# Patient Record
Sex: FEMALE | Race: OTHER | Employment: OTHER | URBAN - METROPOLITAN AREA
[De-identification: names, ages, dates, MRNs, and addresses within clinical notes are randomized per-mention and may not be internally consistent; named-entity substitution may affect disease eponyms.]

---

## 2022-03-30 ENCOUNTER — NEW PATIENT (OUTPATIENT)
Dept: URBAN - METROPOLITAN AREA CLINIC 43 | Facility: CLINIC | Age: 57
End: 2022-03-30

## 2022-03-30 DIAGNOSIS — H04.123: ICD-10-CM

## 2022-03-30 DIAGNOSIS — H52.4: ICD-10-CM

## 2022-03-30 DIAGNOSIS — H52.13: ICD-10-CM

## 2022-03-30 PROCEDURE — 92004 COMPRE OPH EXAM NEW PT 1/>: CPT

## 2022-03-30 PROCEDURE — 92015 DETERMINE REFRACTIVE STATE: CPT

## 2022-03-30 ASSESSMENT — VISUAL ACUITY
OD_SC: 20/400-
OD_CC: 20/20
OD_SC: J10
OD_CC: J1
OS_CC: J1-
OS_SC: J10
OS_CC: 20/20
OS_SC: 20/400-

## 2022-03-30 ASSESSMENT — TONOMETRY
OD_IOP_MMHG: 16
OS_IOP_MMHG: 18

## 2022-03-30 NOTE — PATIENT DISCUSSION
Communicate with PCP and cardiologist. 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 Hx mitral valve prolapse.

## 2022-09-26 ENCOUNTER — FOLLOW UP (OUTPATIENT)
Dept: URBAN - METROPOLITAN AREA CLINIC 47 | Facility: CLINIC | Age: 57
End: 2022-09-26

## 2022-09-26 DIAGNOSIS — H40.013: ICD-10-CM

## 2022-09-26 DIAGNOSIS — H35.62: ICD-10-CM

## 2022-09-26 DIAGNOSIS — H04.123: ICD-10-CM

## 2022-09-26 PROCEDURE — 99214 OFFICE O/P EST MOD 30 MIN: CPT

## 2022-09-26 ASSESSMENT — TONOMETRY
OD_IOP_MMHG: 18
OS_IOP_MMHG: 17

## 2022-09-26 ASSESSMENT — VISUAL ACUITY
OU_SC: 20/20
OD_SC: 20/20
OS_SC: 20/20